# Patient Record
Sex: MALE | Race: WHITE
[De-identification: names, ages, dates, MRNs, and addresses within clinical notes are randomized per-mention and may not be internally consistent; named-entity substitution may affect disease eponyms.]

---

## 2021-04-24 ENCOUNTER — HOSPITAL ENCOUNTER (EMERGENCY)
Dept: HOSPITAL 54 - ER | Age: 31
Discharge: HOME | End: 2021-04-24
Payer: SELF-PAY

## 2021-04-24 VITALS — HEIGHT: 73 IN | WEIGHT: 179 LBS | BODY MASS INDEX: 23.72 KG/M2

## 2021-04-24 VITALS — DIASTOLIC BLOOD PRESSURE: 79 MMHG | SYSTOLIC BLOOD PRESSURE: 128 MMHG

## 2021-04-24 DIAGNOSIS — R55: Primary | ICD-10-CM

## 2021-04-24 NOTE — NUR
KADEN FROM COVID VACCINE SITE TO ER BED 12. AAOX4. NOT IN RESP DISTRESS, 
BRAETHING EVEN AND UNLABORED. BROUGHT IN FOR A SYNCOPAL EPISODE. PER PT HE 
RECEIVED THE 1ST DOSE OF THE PFIZER COVID VACCINE. 30 SEC LATER PT FELT HE IS 
ABOUT TO PASSOUT AND NEXT THING HE KNW THERE ARE 4 PEOPLE AROUND HIM . NO 
TRAUMA NOR FALL OCCURED. NO REPORT LOW BP PER EMS. PT IS RECEIVING NS UPON 
ARRIVAL. MD WAST AT THE BEDSIDE FOR EVAL. ORDERS RECEIVED AND NOTED. MD ORDERED 
TO CONTINUE THE IV FLUIDS THAT WAS RUNNING. PT ON MONITOR